# Patient Record
Sex: MALE | Race: WHITE | URBAN - METROPOLITAN AREA
[De-identification: names, ages, dates, MRNs, and addresses within clinical notes are randomized per-mention and may not be internally consistent; named-entity substitution may affect disease eponyms.]

---

## 2019-05-13 ENCOUNTER — NEW PATIENT (OUTPATIENT)
Dept: URBAN - METROPOLITAN AREA CLINIC 51 | Facility: CLINIC | Age: 59
End: 2019-05-13
Payer: MEDICAID

## 2019-05-13 DIAGNOSIS — Z98.890 OTHER SPECIFIED POSTPROCEDURAL STATES: ICD-10-CM

## 2019-05-13 DIAGNOSIS — G43.B0 MIGRAINE WITHOUT AURA, NOT INTRACTABLE, WITH STATUS MIGRAINOSUS: ICD-10-CM

## 2019-05-13 DIAGNOSIS — H02.834 DERMATOCHALASIS OF LEFT UPPER LID: ICD-10-CM

## 2019-05-13 DIAGNOSIS — Z83.511 FAMILY HISTORY OF GLAUCOMA: ICD-10-CM

## 2019-05-13 DIAGNOSIS — H02.831 DERMATOCHALASIS OF RIGHT UPPER LID: ICD-10-CM

## 2019-05-13 PROCEDURE — 92004 COMPRE OPH EXAM NEW PT 1/>: CPT | Performed by: OPTOMETRIST

## 2019-05-13 PROCEDURE — 92134 CPTRZ OPH DX IMG PST SGM RTA: CPT | Performed by: OPTOMETRIST

## 2019-05-13 ASSESSMENT — VISUAL ACUITY
OD: 20/20
OS: 20/25

## 2019-05-13 ASSESSMENT — INTRAOCULAR PRESSURE
OS: 20
OD: 23

## 2019-05-13 ASSESSMENT — KERATOMETRY
OD: 35.78
OS: 34.46

## 2021-07-20 ENCOUNTER — OFFICE VISIT (OUTPATIENT)
Dept: URBAN - METROPOLITAN AREA CLINIC 51 | Facility: CLINIC | Age: 61
End: 2021-07-20
Payer: MEDICAID

## 2021-07-20 DIAGNOSIS — H43.813 VITREOUS DEGENERATION, BILATERAL: ICD-10-CM

## 2021-07-20 DIAGNOSIS — H25.13 AGE-RELATED NUCLEAR CATARACT, BILATERAL: ICD-10-CM

## 2021-07-20 DIAGNOSIS — H04.123 TEAR FILM INSUFFICIENCY OF BILATERAL LACRIMAL GLANDS: ICD-10-CM

## 2021-07-20 DIAGNOSIS — E11.9 TYPE 2 DIABETES MELLITUS WITHOUT COMPLICATIONS: Primary | ICD-10-CM

## 2021-07-20 DIAGNOSIS — H52.4 PRESBYOPIA: ICD-10-CM

## 2021-07-20 DIAGNOSIS — Z83.518 FAMILY HISTORY OF OTHER SPECIFIED EYE DISORDER: ICD-10-CM

## 2021-07-20 DIAGNOSIS — Z79.84 LONG TERM (CURRENT) USE OF ORAL ANTIDIABETIC DRUGS: ICD-10-CM

## 2021-07-20 PROCEDURE — 92134 CPTRZ OPH DX IMG PST SGM RTA: CPT | Performed by: OPTOMETRIST

## 2021-07-20 PROCEDURE — 92014 COMPRE OPH EXAM EST PT 1/>: CPT | Performed by: OPTOMETRIST

## 2021-07-20 ASSESSMENT — KERATOMETRY
OD: 35.22
OS: 33.63

## 2021-07-20 ASSESSMENT — INTRAOCULAR PRESSURE
OS: 21
OD: 19

## 2021-07-20 NOTE — IMPRESSION/PLAN
Impression: Family history of other specified eye disorder: Z80.1. Plan: Family history of macular degeneration (mother). Due to history, recommend patient start AREDS 2 vitamins (list of brands given to patient). Will continue to monitor annually with MOCT.

## 2021-07-20 NOTE — IMPRESSION/PLAN
Impression: Age-related nuclear cataract, bilateral Plan: The cataracts have minimal impact on vision at this time. Patient will monitor his vision and contact our office with any worsening/ changes in vision. Monitor annually.

## 2021-07-20 NOTE — IMPRESSION/PLAN
Impression: Type 2 diabetes mellitus without complications Plan: No diabetic retinopathy. Recommend yearly diabetic eye exam. Discussed with patient importance of good blood sugar control with regular visits to PCP, compliance to meds, healthy diet, and daily exercise. Discussed the importance of annual diabetic eye exams. Send report to PCP.

## 2021-07-20 NOTE — IMPRESSION/PLAN
Impression: Tear film insufficiency of bilateral lacrimal glands Plan: Recommend AT's at least 1-2 times a day or more OU.

## 2021-07-20 NOTE — IMPRESSION/PLAN
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 Discussed the option of scleral CL's vs. cornea transplant to help with corneal irregularity due to RK. Patient wishes to be referred for scleral CL fitting. Refer to Tawnya WING or Dr. Anusha Franklin.

## 2021-07-20 NOTE — IMPRESSION/PLAN
Impression: Presbyopia Plan: Discussed waiting to get SRx at scleral contact lens fitting, but patient declined and wishes to update SRx today.  Release new SRx per patient request.

## 2021-07-20 NOTE — IMPRESSION/PLAN
Impression: Vitreous degeneration, bilateral Plan: No vitreous cells, retinal tears/holes, or detachments observed today. Patient to RTC immediately if notice symptoms of RD (reviewed with patient). Monitor.